# Patient Record
Sex: FEMALE | Race: WHITE | NOT HISPANIC OR LATINO | Employment: STUDENT | URBAN - METROPOLITAN AREA
[De-identification: names, ages, dates, MRNs, and addresses within clinical notes are randomized per-mention and may not be internally consistent; named-entity substitution may affect disease eponyms.]

---
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
## 2024-10-31 ENCOUNTER — TELEPHONE (OUTPATIENT)
Dept: PEDIATRICS | Facility: CLINIC | Age: 19
End: 2024-10-31
Payer: COMMERCIAL

## 2024-11-19 ENCOUNTER — TELEPHONE (OUTPATIENT)
Dept: PEDIATRICS | Facility: CLINIC | Age: 19
End: 2024-11-19
Payer: COMMERCIAL

## 2024-11-19 DIAGNOSIS — J45.20 MILD INTERMITTENT ASTHMA WITHOUT COMPLICATION (HHS-HCC): Primary | ICD-10-CM

## 2024-11-19 NOTE — TELEPHONE ENCOUNTER
Rx Refill Request Telephone Encounter    Name:  Halina Jimenez  :  [id number]  Medication Name:  albuterol 90 mcg/actuation inhaler   Dose :    Route :    Frequency :    Quantity :    Directions :    Specific Pharmacy location:  [facility]/pharmacy #[id number]  [place], [place] - [address] AT [facility] [address]   Date of last appointment:  23  Date of next appointment:  24  Best number to reach patient:  [phone]     Pt is recovering from a cold which has caused a flare up requiring her inhaler.

## 2024-11-20 RX ORDER — ALBUTEROL SULFATE 90 UG/1
2 INHALANT RESPIRATORY (INHALATION) EVERY 4 HOURS PRN
Qty: 18 G | Refills: 0 | Status: SHIPPED | OUTPATIENT
Start: 2024-11-20

## 2024-11-22 ENCOUNTER — TELEPHONE (OUTPATIENT)
Dept: PEDIATRICS | Facility: CLINIC | Age: 19
End: 2024-11-22
Payer: COMMERCIAL

## 2024-11-22 DIAGNOSIS — J30.1 SEASONAL ALLERGIC RHINITIS DUE TO POLLEN: ICD-10-CM

## 2024-11-22 DIAGNOSIS — N92.0 MENORRHAGIA WITH REGULAR CYCLE: ICD-10-CM

## 2024-11-22 RX ORDER — MONTELUKAST SODIUM 10 MG/1
10 TABLET ORAL NIGHTLY
Qty: 90 TABLET | Refills: 0 | Status: SHIPPED | OUTPATIENT
Start: 2024-11-22 | End: 2025-02-20

## 2024-11-22 RX ORDER — NORETHINDRONE AND ETHINYL ESTRADIOL 0.5-0.035
1 KIT ORAL DAILY
Qty: 84 TABLET | Refills: 0 | Status: SHIPPED | OUTPATIENT
Start: 2024-11-22 | End: 2025-02-14

## 2024-11-22 NOTE — TELEPHONE ENCOUNTER
Rx Refill Request Telephone Encounter    Name:  Halina Jimenez  :  [id number]  Medication Name:  montelukast (Singulair) 10 mg tablet ,Nortrel 0.5/35, 28, 0.5-35 mg-mcg tablet     Specific Pharmacy location:  [facility]/pharmacy #[id number] [facility] [address] AT [address] [facility] [address]   Date of last appointment:  23  Date of next appointment:24  Best number to reach patient:  [phone]

## 2024-12-20 ENCOUNTER — APPOINTMENT (OUTPATIENT)
Dept: PEDIATRICS | Facility: CLINIC | Age: 19
End: 2024-12-20
Payer: COMMERCIAL

## 2024-12-20 VITALS
HEART RATE: 82 BPM | OXYGEN SATURATION: 96 % | DIASTOLIC BLOOD PRESSURE: 70 MMHG | BODY MASS INDEX: 29.77 KG/M2 | WEIGHT: 168 LBS | HEIGHT: 63 IN | SYSTOLIC BLOOD PRESSURE: 132 MMHG

## 2024-12-20 DIAGNOSIS — Z23 NEED FOR VACCINATION: ICD-10-CM

## 2024-12-20 DIAGNOSIS — L98.8 PILONIDAL DISEASE: ICD-10-CM

## 2024-12-20 DIAGNOSIS — R41.840 INATTENTION: ICD-10-CM

## 2024-12-20 DIAGNOSIS — J45.20 MILD INTERMITTENT ASTHMA WITHOUT COMPLICATION (HHS-HCC): ICD-10-CM

## 2024-12-20 DIAGNOSIS — J30.1 SEASONAL ALLERGIC RHINITIS DUE TO POLLEN: ICD-10-CM

## 2024-12-20 DIAGNOSIS — Z23 NEED FOR INFLUENZA VACCINATION: ICD-10-CM

## 2024-12-20 DIAGNOSIS — N92.0 MENORRHAGIA WITH REGULAR CYCLE: ICD-10-CM

## 2024-12-20 DIAGNOSIS — Z00.00 WELLNESS EXAMINATION: Primary | ICD-10-CM

## 2024-12-20 PROBLEM — R06.00 DYSPNEA: Status: RESOLVED | Noted: 2023-11-07 | Resolved: 2024-12-20

## 2024-12-20 PROBLEM — J38.3 VOCAL CORD DYSFUNCTION: Status: RESOLVED | Noted: 2022-03-23 | Resolved: 2024-12-20

## 2024-12-20 PROBLEM — R42 DIZZINESS: Status: RESOLVED | Noted: 2023-11-07 | Resolved: 2024-12-20

## 2024-12-20 PROBLEM — K59.00 CONSTIPATION: Status: RESOLVED | Noted: 2022-07-15 | Resolved: 2024-12-20

## 2024-12-20 PROBLEM — L05.01 PILONIDAL CYST WITH ABSCESS: Status: RESOLVED | Noted: 2020-02-10 | Resolved: 2024-12-20

## 2024-12-20 PROCEDURE — 90472 IMMUNIZATION ADMIN EACH ADD: CPT | Performed by: PEDIATRICS

## 2024-12-20 PROCEDURE — 99213 OFFICE O/P EST LOW 20 MIN: CPT | Performed by: PEDIATRICS

## 2024-12-20 PROCEDURE — 90715 TDAP VACCINE 7 YRS/> IM: CPT | Performed by: PEDIATRICS

## 2024-12-20 PROCEDURE — 1036F TOBACCO NON-USER: CPT | Performed by: PEDIATRICS

## 2024-12-20 PROCEDURE — 96127 BRIEF EMOTIONAL/BEHAV ASSMT: CPT | Performed by: PEDIATRICS

## 2024-12-20 PROCEDURE — 99395 PREV VISIT EST AGE 18-39: CPT | Performed by: PEDIATRICS

## 2024-12-20 PROCEDURE — 90656 IIV3 VACC NO PRSV 0.5 ML IM: CPT | Performed by: PEDIATRICS

## 2024-12-20 PROCEDURE — 3008F BODY MASS INDEX DOCD: CPT | Performed by: PEDIATRICS

## 2024-12-20 PROCEDURE — 90471 IMMUNIZATION ADMIN: CPT | Performed by: PEDIATRICS

## 2024-12-20 RX ORDER — BUDESONIDE AND FORMOTEROL FUMARATE DIHYDRATE 160; 4.5 UG/1; UG/1
2 AEROSOL RESPIRATORY (INHALATION)
Qty: 10.2 G | Refills: 2 | Status: SHIPPED | OUTPATIENT
Start: 2024-12-20 | End: 2024-12-21

## 2024-12-20 RX ORDER — MONTELUKAST SODIUM 10 MG/1
10 TABLET ORAL NIGHTLY
Qty: 90 TABLET | Refills: 3 | Status: SHIPPED | OUTPATIENT
Start: 2024-12-20 | End: 2025-12-15

## 2024-12-20 RX ORDER — ALBUTEROL SULFATE 90 UG/1
2 INHALANT RESPIRATORY (INHALATION) EVERY 4 HOURS PRN
Qty: 18 G | Refills: 4 | Status: SHIPPED | OUTPATIENT
Start: 2024-12-20

## 2024-12-20 RX ORDER — NORETHINDRONE AND ETHINYL ESTRADIOL 0.5-0.035
1 KIT ORAL DAILY
Qty: 84 TABLET | Refills: 4 | Status: SHIPPED | OUTPATIENT
Start: 2024-12-20 | End: 2026-02-13

## 2024-12-20 ASSESSMENT — PATIENT HEALTH QUESTIONNAIRE - PHQ9
3. TROUBLE FALLING OR STAYING ASLEEP OR SLEEPING TOO MUCH: SEVERAL DAYS
8. MOVING OR SPEAKING SO SLOWLY THAT OTHER PEOPLE COULD HAVE NOTICED. OR THE OPPOSITE, BEING SO FIGETY OR RESTLESS THAT YOU HAVE BEEN MOVING AROUND A LOT MORE THAN USUAL: NOT AT ALL
SUM OF ALL RESPONSES TO PHQ QUESTIONS 1-9: 8
4. FEELING TIRED OR HAVING LITTLE ENERGY: SEVERAL DAYS
6. FEELING BAD ABOUT YOURSELF - OR THAT YOU ARE A FAILURE OR HAVE LET YOURSELF OR YOUR FAMILY DOWN: SEVERAL DAYS
1. LITTLE INTEREST OR PLEASURE IN DOING THINGS: NOT AT ALL
2. FEELING DOWN, DEPRESSED OR HOPELESS: NOT AT ALL
SUM OF ALL RESPONSES TO PHQ9 QUESTIONS 1 AND 2: 0
9. THOUGHTS THAT YOU WOULD BE BETTER OFF DEAD, OR OF HURTING YOURSELF: NOT AT ALL
5. POOR APPETITE OR OVEREATING: MORE THAN HALF THE DAYS
7. TROUBLE CONCENTRATING ON THINGS, SUCH AS READING THE NEWSPAPER OR WATCHING TELEVISION: NEARLY EVERY DAY

## 2024-12-20 NOTE — PROGRESS NOTES
3 concerns in addition to well issues:  1) was sick with a resp infection over Thanksgiving then got better.  Got sick again before finals started and is still coughing.  Night sweats at the beginning which have resolved.  Was using albuterol inhaler for illness and exercise.  Really used it just once this fall.  Then has been using it regularly since Thanksgiving.  Helps when it's on board.  Uses singulair daily.   2) Anxiety - really affecting her ability to function. Her weight affects her mood.  She is very insecure about how others feel about her.  Freshman year was hard and then last year was ok but this fall was bad again.    3) Wonders if she should get treated for add?  Would this help? Was evaluated at some point but has never been on meds.  Will need to track down the Nikolai eval    1/2/2024 - I called mom and asked her to track down the Nikolai report and get it to me.

## 2024-12-20 NOTE — ASSESSMENT & PLAN NOTE
Rec counseling but she will be abroad for the semester - she thinks she will be fine.  Follow up with me when she returns.  I need to contact mom in regards to the Heydi evaluation.     Books for Anxiety:  Freeing Yourself From Anxiety by Kendra Donovan  The Worry Cure by Mike Templeton    Apps:   Calm Judy; Mood [place]; Insight Timer; Happy Not Perfect

## 2024-12-20 NOTE — PROGRESS NOTES
"Subjective   History was provided by Halina.  Halina Jimenez is a 19 y.o. female who is here for this well visit.      Current Issues:  Current concerns: ADHD, anxiety, asthma/cough - see additional note.    Vision or hearing concerns? no  Dental care up to date? Yes- brushes teeth 2 times/day, regular dental visits, does floss teeth   No significant recent health issues - was actually healthy until Thanksgiving.   Specialist visits - none    Review of Nutrition, Elimination, and Sleep:  Current diet:  3 meals/day, diet well balanced, normal portions, <8oz. sugar containing beverages daily, adequate dairy intake, diet includes fruits and vegetables and protein.  Elimination: normal bowel movement frequency, normal consistency   Sleep: has structured bedtime routine, sleeps through the night, no trouble getting up    School and Social Screening:  School: [employer].  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   Supportive social network. Current relationship - none.     Sports Participation Screening:  Gets regular exercise.    Screening Questions:  Other: normal mood, satisfied with body weight  Risk factors for dyslipidemia: no  Risk factors for sexually-transmitted infections:   Sexually active: no   Using condoms: N/A  Substance use:  Smoking - No  Vaping - No  Drinking - Yes  Drugs - No  Genitourinary:  normal menses - no issues    Objective   /70 (BP Location: Left arm, Patient Position: Sitting)   Pulse 82   Ht 1.607 m (5' 3.25\")   Wt 76.2 kg (168 lb)   SpO2 96%   BMI 29.53 kg/m²   Growth parameters are noted and are appropriate for age.    Physical Exam  Constitutional:       Appearance: Normal appearance.   HENT:      Right Ear: Tympanic membrane normal.      Left Ear: Tympanic membrane normal.      Nose: Nose normal.      Mouth/Throat:      Mouth: Mucous membranes are moist.      Pharynx: Oropharynx is clear.   Eyes:      Extraocular Movements: Extraocular " movements intact.   Cardiovascular:      Rate and Rhythm: Normal rate and regular rhythm.      Pulses:           Femoral pulses are 2+ on the right side and 2+ on the left side.     Heart sounds: No murmur heard.  Pulmonary:      Effort: Pulmonary effort is normal.      Breath sounds: Normal breath sounds.   Chest:   Breasts:     Bc Score is 5.      Breasts are symmetrical.   Abdominal:      General: Abdomen is flat.      Palpations: Abdomen is soft. There is no hepatomegaly, splenomegaly or mass.   Genitourinary:     Comments: Pt declines exam  Musculoskeletal:         General: Normal range of motion.      Cervical back: Normal range of motion and neck supple.      Thoracic back: No scoliosis.      Lumbar back: No scoliosis.   Lymphadenopathy:      Cervical: No cervical adenopathy.   Skin:     General: Skin is warm.      Findings: No acne.   Neurological:      General: No focal deficit present.      Mental Status: She is alert.      Deep Tendon Reflexes:      Reflex Scores:       Patellar reflexes are 2+ on the right side and 2+ on the left side.  Psychiatric:         Mood and Affect: Mood normal.         Behavior: Behavior normal.         Assessment/Plan   Halina was seen today for well child.  Diagnoses and all orders for this visit:  Wellness examination (Primary)  Need for vaccination  -     Tdap vaccine, age 7 years and older  Need for influenza vaccination  -     Flu vaccine, trivalent, preservative free, age 6 months and greater (Fluraix/Fluzone/Flulaval)  Mild intermittent asthma without complication ([facility])  -     albuterol 90 mcg/actuation inhaler; Inhale 2 puffs every 4 hours if needed for wheezing.  -     budesonide-formoteroL (Symbicort) 160-4.5 mcg/actuation inhaler; Inhale 2 puffs 2 times a day. Rinse mouth with water after use to reduce aftertaste and incidence of candidiasis. Do not swallow.  Inattention  Menorrhagia with regular cycle  -     Nortrel 0.5/35, 28, 0.5-35 mg-mcg tablet; Take 1  tablet by mouth once daily.  Seasonal allergic rhinitis due to pollen  -     montelukast (Singulair) 10 mg tablet; Take 1 tablet (10 mg) by mouth once daily at bedtime.  Pilonidal disease  Other orders  -     Follow Up In Pediatrics - Health Maintenance; Future  -     Follow Up In Pediatrics - Established Behavioral; Future    Well young adult.  - Anticipatory guidance discussed.   - Injury prevention: wearing seatbelt, understanding sun protection, understanding conflict resolution/violence prevention,  reviewed driving safety    -Risk Taking: cardiac risk factors reviewed, alcohol, drug and tobacco use reviewed, reviewed internet safety      - Growth and weight gain appropriate. The patient was counseled regarding nutrition and physical activity.  - Development: appropriate for age  - Get in the habit of monthly breast exams.    - Immunizations as ordered.  All vaccines given at today’s visit were reviewed with the family. Risks/benefits/side effects discussed and VIS sheet provided. All questions answered. Given with consent   - Follow up in 1 year for next well child exam or sooner with concerns.    Problem List Items Addressed This Visit       Menorrhagia    Relevant Medications    Nortrel 0.5/35, 28, 0.5-35 mg-mcg tablet    Mild intermittent asthma without complication ([facility])     Due to increased symptoms will try steroid/long acting medication.           Relevant Medications    albuterol 90 mcg/actuation inhaler    budesonide-formoteroL (Symbicort) 160-4.5 mcg/actuation inhaler    Pilonidal disease     No issues.          Inattention     Other Visit Diagnoses       Wellness examination    -  Primary    Need for vaccination        Relevant Orders    Tdap vaccine, age 7 years and older (Completed)    Need for influenza vaccination        Relevant Orders    Flu vaccine, trivalent, preservative free, age 6 months and greater (Fluraix/Fluzone/Flulaval) (Completed)    Seasonal allergic rhinitis due to pollen         Relevant Medications    montelukast (Singulair) 10 mg tablet

## 2024-12-21 RX ORDER — FLUTICASONE PROPIONATE AND SALMETEROL 250; 50 UG/1; UG/1
1 POWDER RESPIRATORY (INHALATION)
Qty: 60 EACH | Refills: 2 | Status: SHIPPED | OUTPATIENT
Start: 2024-12-21 | End: 2025-03-21

## 2024-12-27 NOTE — TELEPHONE ENCOUNTER
Pt called is away at college,  has taken antibiotics frequently in the past year for various reasons. Pt has a cut on thumb that [place]not a ligia ladder). Went to an urgent care, and was prescribed cephalexin. Pt wants to know if it is worth taking the antibiotic because she has been on antibiotics so much in the past year. She is also concerned if it is the right thing to take for the cut.   Pt scheduled.

## 2025-02-26 ENCOUNTER — TELEPHONE (OUTPATIENT)
Dept: PEDIATRICS | Facility: CLINIC | Age: 20
End: 2025-02-26

## 2025-02-26 NOTE — TELEPHONE ENCOUNTER
Phone call from patient, patient is currently in [facility]. Pt was checked for lice and they found nits at a lice clinic. They did the lice comb. Bought the lice shampoo and spray, will go back next week at get re-checked. Clinic advised putting brushes, hair ties in freezer for few days. Also washed all fabric in the the house. Patient is [place] country, wanted to check if you would agree with the advice. Did not offer appointment since she is out of country.  Patient was informed a return call can take up to 24-48 hours, ok with waiting.

## 2025-02-28 NOTE — TELEPHONE ENCOUNTER
I discussed with Halina.  The directions are fine.  All roommates are away this weekend so the apartment should be lice free when they get back.

## 2025-05-12 ENCOUNTER — APPOINTMENT (OUTPATIENT)
Dept: PEDIATRICS | Facility: CLINIC | Age: 20
End: 2025-05-12
Payer: COMMERCIAL

## 2025-05-12 VITALS
HEART RATE: 81 BPM | DIASTOLIC BLOOD PRESSURE: 82 MMHG | SYSTOLIC BLOOD PRESSURE: 119 MMHG | WEIGHT: 175 LBS | BODY MASS INDEX: 29.88 KG/M2 | HEIGHT: 64 IN

## 2025-05-12 DIAGNOSIS — F41.9 ANXIETY: ICD-10-CM

## 2025-05-12 DIAGNOSIS — F32.9 REACTIVE DEPRESSION: ICD-10-CM

## 2025-05-12 DIAGNOSIS — R63.5 WEIGHT GAIN: Primary | ICD-10-CM

## 2025-05-12 PROBLEM — S39.92XA INJURY OF COCCYX: Status: ACTIVE | Noted: 2025-05-12

## 2025-05-12 PROBLEM — H69.90 DYSFUNCTION OF EUSTACHIAN TUBE: Status: ACTIVE | Noted: 2025-05-12

## 2025-05-12 PROBLEM — J30.9 ALLERGIC RHINITIS: Status: ACTIVE | Noted: 2025-05-12

## 2025-05-12 PROBLEM — J38.7 DISEASE OF LARYNX: Status: ACTIVE | Noted: 2025-05-12

## 2025-05-12 PROCEDURE — 3008F BODY MASS INDEX DOCD: CPT | Performed by: PEDIATRICS

## 2025-05-12 PROCEDURE — 1036F TOBACCO NON-USER: CPT | Performed by: PEDIATRICS

## 2025-05-12 PROCEDURE — 96127 BRIEF EMOTIONAL/BEHAV ASSMT: CPT | Performed by: PEDIATRICS

## 2025-05-12 PROCEDURE — 99215 OFFICE O/P EST HI 40 MIN: CPT | Performed by: PEDIATRICS

## 2025-05-12 NOTE — PROGRESS NOTES
Subjective   Halina Jimenez is a 20 y.o. female who presents for addressing anxiety disorder and mood concerns, and ongoing weight concerns which adversely affects both mood and anxiety. She has the following anxiety symptoms: insomnia, irritable, palpitations, psychomotor agitation, racing thoughts, shortness of breath, and nausea. Onset of symptoms was years ago although it has increased over the last 2 years. Symptoms have been gradually worsening since that time. She denies current suicidal and homicidal ideation. Family history significant for anxiety. Risk factors: several different issues all affecting each other. Previous treatment includes counseling but it has been years.     Worse - thoughts about weight, new situations, social interaction  Better - talk to mom, write about it    Eating - overall good - bit stress eats  Sleep - hasn't been sleeping through the night    Mood - sometimes defeated, angry  Likes to be alone in her room    Some alcohol - no issues  No weed.     Currently with skin issues because she's off allegra for allergy testing  Upper lip feels like a chemical burn for a month with mild swelling - no change in products  A few episodes of chills recently  Hair fine  Has always had an issue with pooping although better since being back from [facility]  Speech is bad when she has anxiety - can't get words out  Energy level is pretty good    Objective   Physical Exam    Assessment/Plan   Halina was seen today for anxiety.  Diagnoses and all orders for this visit:  Weight gain (Primary)  -     TSH with reflex to Free T4 if abnormal; Future  -     CBC and Auto Differential; Future  -     Ferritin; Future  -     Iron and TIBC; Future  -     Comprehensive Metabolic Panel; Future  -     Lipid Panel; Future  -     Hemoglobin A1C; Future  -     Testosterone,Free and Total; Future  -     TSH with reflex to Free T4 if abnormal  -     CBC and Auto Differential  -     Ferritin  -     Iron and TIBC  -      Comprehensive Metabolic Panel  -     Lipid Panel  -     Hemoglobin A1C  -     Testosterone,Free and Total  Anxiety  Reactive depression    Halina as an anxiety disorder which is worsening, affecting mood, and adversely influenced by ongoing weight concerns.  It is reasonable to rule out possible organic contributing causes.  Medications: Zoloft. Start at 25 and increase to 50 at 2 weeks if there are no side effects. Follow up at 2-3 weeks.  Discussed r/B.  Refer to counseling:  The following is an ever changing list of both groups and individuals who offer care for a variety of mental health issues. Please look them up on line to confirm that they offer services in line with what is needed.    Groups:  [facility]  [facility]    Individuals:  Psychologists:  Jamey Reynoso Aarti    Social Work:  Regina Queen Becca    Refer to Endocrine for discussion re semaglutide.   [facility] - weight loss including semaglutides    I spent a total of 50 minutes on the date of service which included preparing to see the patient, face to face patient care, completing clinical documentation, obtaining and/or reviewing separately reviewed history, with more than half the time spent performing a medically appropriate examination, counseling and educating the patient/family/caregiver and ordering medications, tests, or procedures.

## 2025-05-14 ENCOUNTER — PATIENT MESSAGE (OUTPATIENT)
Dept: PEDIATRICS | Facility: CLINIC | Age: 20
End: 2025-05-14
Payer: COMMERCIAL

## 2025-05-14 ENCOUNTER — TELEPHONE (OUTPATIENT)
Dept: PEDIATRICS | Facility: CLINIC | Age: 20
End: 2025-05-14
Payer: COMMERCIAL

## 2025-05-14 DIAGNOSIS — F41.9 ANXIETY: Primary | ICD-10-CM

## 2025-05-14 RX ORDER — SERTRALINE HYDROCHLORIDE 25 MG/1
TABLET, FILM COATED ORAL
Qty: 42 TABLET | Refills: 0 | Status: SHIPPED | OUTPATIENT
Start: 2025-05-14 | End: 2025-06-11

## 2025-05-14 NOTE — TELEPHONE ENCOUNTER
Halina called to check on status of prescription from 5/12/25. Could you send the script in for the Zoloft?

## 2025-05-16 ENCOUNTER — OFFICE VISIT (OUTPATIENT)
Dept: PEDIATRICS | Facility: CLINIC | Age: 20
End: 2025-05-16
Payer: COMMERCIAL

## 2025-05-16 VITALS
DIASTOLIC BLOOD PRESSURE: 83 MMHG | HEART RATE: 86 BPM | BODY MASS INDEX: 30.05 KG/M2 | WEIGHT: 176 LBS | SYSTOLIC BLOOD PRESSURE: 117 MMHG | TEMPERATURE: 99.3 F | HEIGHT: 64 IN

## 2025-05-16 DIAGNOSIS — J06.9 VIRAL UPPER RESPIRATORY TRACT INFECTION: Primary | ICD-10-CM

## 2025-05-16 DIAGNOSIS — J30.9 ALLERGIC RHINITIS, UNSPECIFIED SEASONALITY, UNSPECIFIED TRIGGER: ICD-10-CM

## 2025-05-16 LAB
ALBUMIN SERPL-MCNC: 4.7 G/DL (ref 3.6–5.1)
ALP SERPL-CCNC: 56 U/L (ref 31–125)
ALT SERPL-CCNC: 16 U/L (ref 6–29)
ANION GAP SERPL CALCULATED.4IONS-SCNC: 10 MMOL/L (CALC) (ref 7–17)
AST SERPL-CCNC: 21 U/L (ref 10–30)
BASOPHILS # BLD AUTO: 32 CELLS/UL (ref 0–200)
BASOPHILS NFR BLD AUTO: 0.5 %
BILIRUB SERPL-MCNC: 0.5 MG/DL (ref 0.2–1.2)
BUN SERPL-MCNC: 14 MG/DL (ref 7–25)
CALCIUM SERPL-MCNC: 9.8 MG/DL (ref 8.6–10.2)
CHLORIDE SERPL-SCNC: 102 MMOL/L (ref 98–110)
CHOLEST SERPL-MCNC: 187 MG/DL
CHOLEST/HDLC SERPL: 2.9 (CALC)
CO2 SERPL-SCNC: 26 MMOL/L (ref 20–32)
CREAT SERPL-MCNC: 0.63 MG/DL (ref 0.5–0.96)
EGFRCR SERPLBLD CKD-EPI 2021: 129 ML/MIN/1.73M2
EOSINOPHIL # BLD AUTO: 90 CELLS/UL (ref 15–500)
EOSINOPHIL NFR BLD AUTO: 1.4 %
ERYTHROCYTE [DISTWIDTH] IN BLOOD BY AUTOMATED COUNT: 13.1 % (ref 11–15)
EST. AVERAGE GLUCOSE BLD GHB EST-MCNC: 111 MG/DL
EST. AVERAGE GLUCOSE BLD GHB EST-SCNC: 6.2 MMOL/L
FERRITIN SERPL-MCNC: 108 NG/ML (ref 16–154)
GLUCOSE SERPL-MCNC: 88 MG/DL (ref 65–99)
HBA1C MFR BLD: 5.5 %
HCT VFR BLD AUTO: 41.2 % (ref 35–45)
HDLC SERPL-MCNC: 65 MG/DL
HGB BLD-MCNC: 13.5 G/DL (ref 11.7–15.5)
IRON SATN MFR SERPL: 18 % (CALC) (ref 16–45)
IRON SERPL-MCNC: 77 MCG/DL (ref 40–190)
LDLC SERPL CALC-MCNC: 99 MG/DL (CALC)
LYMPHOCYTES # BLD AUTO: 1914 CELLS/UL (ref 850–3900)
LYMPHOCYTES NFR BLD AUTO: 29.9 %
MCH RBC QN AUTO: 29.3 PG (ref 27–33)
MCHC RBC AUTO-ENTMCNC: 32.8 G/DL (ref 32–36)
MCV RBC AUTO: 89.4 FL (ref 80–100)
MONOCYTES # BLD AUTO: 333 CELLS/UL (ref 200–950)
MONOCYTES NFR BLD AUTO: 5.2 %
NEUTROPHILS # BLD AUTO: 4032 CELLS/UL (ref 1500–7800)
NEUTROPHILS NFR BLD AUTO: 63 %
NONHDLC SERPL-MCNC: 122 MG/DL (CALC)
PLATELET # BLD AUTO: 244 THOUSAND/UL (ref 140–400)
PMV BLD REES-ECKER: 10.8 FL (ref 7.5–12.5)
POTASSIUM SERPL-SCNC: 4.6 MMOL/L (ref 3.5–5.3)
PROT SERPL-MCNC: 7.8 G/DL (ref 6.1–8.1)
RBC # BLD AUTO: 4.61 MILLION/UL (ref 3.8–5.1)
SODIUM SERPL-SCNC: 138 MMOL/L (ref 135–146)
TESTOST FREE SERPL-MCNC: 1.2 PG/ML (ref 0.1–6.4)
TESTOST SERPL-MCNC: 24 NG/DL (ref 2–45)
TIBC SERPL-MCNC: 417 MCG/DL (CALC) (ref 250–450)
TRIGL SERPL-MCNC: 134 MG/DL
TSH SERPL-ACNC: 3.08 MIU/L
WBC # BLD AUTO: 6.4 THOUSAND/UL (ref 3.8–10.8)

## 2025-05-16 PROCEDURE — 99213 OFFICE O/P EST LOW 20 MIN: CPT | Performed by: PEDIATRICS

## 2025-05-16 PROCEDURE — 3008F BODY MASS INDEX DOCD: CPT | Performed by: PEDIATRICS

## 2025-05-16 NOTE — PROGRESS NOTES
"Nilesh Jimenez is a 20 y.o. female who presents for Nasal Congestion (Here by herself for congestion/ headache).    HPI:    Recently came off allergy meds for a week for testing.  Developed sx at that time.  Back on the meds: antihistamine and singulair and azalastine.  Sx not resolved.  Sx have been about 6 days.    Is getting shaking chills, hot flashes.  Started first dose of zoloft today.  No known sick contacts.    Asthma not bad, but did take inhaler this morning for her cough.       Objective   /83   Pulse 86   Temp 37.4 °C (99.3 °F)   Ht 1.613 m (5' 3.5\")   Wt 79.8 kg (176 lb)   BMI 30.69 kg/m²   Physical Exam  Constitutional:       Appearance: Normal appearance.   HENT:      Right Ear: Tympanic membrane and external ear normal.      Left Ear: Tympanic membrane and external ear normal.      Nose: Congestion and rhinorrhea present.      Mouth/Throat:      Mouth: Mucous membranes are moist.   Eyes:      General:         Right eye: No discharge.         Left eye: No discharge.      Extraocular Movements: Extraocular movements intact.      Conjunctiva/sclera: Conjunctivae normal.      Pupils: Pupils are equal, round, and reactive to light.   Cardiovascular:      Rate and Rhythm: Normal rate and regular rhythm.      Heart sounds: Normal heart sounds.   Pulmonary:      Effort: Pulmonary effort is normal.      Breath sounds: Normal breath sounds.   Abdominal:      General: Bowel sounds are normal.      Palpations: Abdomen is soft.   Musculoskeletal:      Cervical back: Neck supple.   Lymphadenopathy:      Cervical: No cervical adenopathy.   Skin:     General: Skin is warm.   Neurological:      General: No focal deficit present.      Mental Status: She is alert.       Assessment/Plan   Problem List Items Addressed This Visit          Medium    Allergic rhinitis    Overview   Comment on above: Added by Problem List Migration; 2013-12-5;          Other Visit Diagnoses         Viral upper " respiratory tract infection    -  Primary        Exam looks more like allergic symptoms, but it does sound like she may have had some fevers.    Maximize allergy treatments:  antihistamine, singulair, azalastine, and sensimyst.  If this is a viral URI, will need to run its course:  sleep, hydration, and time.

## 2025-05-28 RX ORDER — FLUTICASONE FUROATE 27.5 UG/1
2 SPRAY, METERED NASAL
COMMUNITY
Start: 2025-05-14

## 2025-05-28 RX ORDER — AZELASTINE 1 MG/ML
2 SPRAY, METERED NASAL 2 TIMES DAILY
COMMUNITY
Start: 2023-01-09

## 2025-05-28 RX ORDER — DESONIDE 0.5 MG/G
OINTMENT TOPICAL
COMMUNITY
Start: 2025-05-14

## 2025-05-29 ENCOUNTER — APPOINTMENT (OUTPATIENT)
Dept: PEDIATRICS | Facility: CLINIC | Age: 20
End: 2025-05-29
Payer: COMMERCIAL

## 2025-05-29 VITALS
BODY MASS INDEX: 29.88 KG/M2 | WEIGHT: 175 LBS | SYSTOLIC BLOOD PRESSURE: 105 MMHG | HEART RATE: 82 BPM | HEIGHT: 64 IN | DIASTOLIC BLOOD PRESSURE: 72 MMHG

## 2025-05-29 DIAGNOSIS — F32.9 REACTIVE DEPRESSION: ICD-10-CM

## 2025-05-29 DIAGNOSIS — R61 NIGHT SWEATS: ICD-10-CM

## 2025-05-29 DIAGNOSIS — F41.9 ANXIETY: Primary | ICD-10-CM

## 2025-05-29 PROCEDURE — 99214 OFFICE O/P EST MOD 30 MIN: CPT | Performed by: PEDIATRICS

## 2025-05-29 PROCEDURE — 3008F BODY MASS INDEX DOCD: CPT | Performed by: PEDIATRICS

## 2025-05-29 RX ORDER — SERTRALINE HYDROCHLORIDE 50 MG/1
50 TABLET, FILM COATED ORAL DAILY
Qty: 30 TABLET | Refills: 0 | Status: SHIPPED | OUTPATIENT
Start: 2025-05-29 | End: 2025-05-29 | Stop reason: SINTOL

## 2025-05-29 RX ORDER — FLUOXETINE 10 MG/1
TABLET ORAL
Qty: 63 TABLET | Refills: 0 | Status: SHIPPED | OUTPATIENT
Start: 2025-05-29 | End: 2025-07-03

## 2025-05-29 NOTE — PROGRESS NOTES
"Mental Health Follow-up  Halina Jimenez is a 20 y.o. female  following up today for mental health status.  No benefits to meds yet.  Counseling: not yet  Current medication: zoloft 25mg  Patient reports that she is having night sweats throughout the night that wake her.  Patient reports symptoms have remained unchanged since last visit.  Has always been hot at night but has never had drenching sweats.  Needs to change where she is in bed.  Wakes her several times a night.  No recent travel to malaria areas. Normal lab work in May.  Is fatigued but otherwise no physical symptoms.     Depression Symptoms: is doing ok    Anxiety Symptoms: same - social    Self Harm Symptoms:   Self Mutilation: denies  Suicidal Ideation: denies    Alcohol and drug use discussed and denied.     Review of Systems: Negative except those noted in current and interim history    PHYSICAL EXAM  /72 (BP Location: Right arm, Patient Position: Sitting, BP Cuff Size: Large adult)   Pulse 82   Ht 1.626 m (5' 4\")   Wt 79.4 kg (175 lb)   BMI 30.04 kg/m²     General:  alert and oriented, in no acute distress   HEENT:  throat normal without erythema or exudate   Neck: no adenopathy and thyroid not enlarged, symmetric, no tenderness/mass/nodules.   Lungs: clear to auscultation bilaterally   Heart: regular rate and rhythm, S1, S2 normal, no murmur, click, rub or gallop   Skin:  warm and dry, no hyperpigmentation, vitiligo, or suspicious lesions      Extremities:  extremities normal, warm and well-perfused; no cyanosis, clubbing, or edema      Neurological: alert, oriented x 3, no deficits noted in general exam.     ASSESSMENT AND PLAN  Diagnoses and all orders for this visit:  Anxiety  -     FLUoxetine (PROzac) 10 mg tablet; Take 1 tablet (10 mg) by mouth once daily for 7 days, THEN 2 tablets (20 mg) once daily for 28 days.  Night sweats  Reactive depression      Halina Jimenez does meet criteria for Adjustment Disorder with Mixed Anxiety and " Depressed Mood and weight concerns.    The plan is to increase dose of zoloft* to at 50 mg/day.    Recommendations were discussed and patient and/or parent agree(s) to the above plan. Patient and/or parent demonstrate understanding and acceptance of risks and benefits and plan.  Contracted verbally for safety.  Patient instructed to call if concerns and to follow up in clinic in 1 month. May return to clinic or call sooner if significant side effects or concerns.  I discussed nightsweats with psychiatry and 2 reported that they had seen it as a side effect that didn't get better.  We had chosen zoloft due to mom being on it.  Will switch to prozac.  I spent 20 minutes of a total visit time of 30 minutes (>50%) counseling, coordinating services and care plan.  Reviewed allergy notes.

## 2025-07-01 ENCOUNTER — APPOINTMENT (OUTPATIENT)
Dept: ENDOCRINOLOGY | Facility: CLINIC | Age: 20
End: 2025-07-01
Payer: COMMERCIAL

## 2025-07-01 VITALS — HEIGHT: 64 IN | BODY MASS INDEX: 29.88 KG/M2 | WEIGHT: 175 LBS

## 2025-07-01 DIAGNOSIS — Z00.00 HEALTH MAINTENANCE EXAMINATION: ICD-10-CM

## 2025-07-01 DIAGNOSIS — E66.811 OBESITY, CLASS I, BMI 30-34.9: Primary | ICD-10-CM

## 2025-07-01 DIAGNOSIS — F41.9 ANXIETY: ICD-10-CM

## 2025-07-01 PROCEDURE — 1036F TOBACCO NON-USER: CPT | Performed by: NURSE PRACTITIONER

## 2025-07-01 PROCEDURE — 99204 OFFICE O/P NEW MOD 45 MIN: CPT | Performed by: NURSE PRACTITIONER

## 2025-07-01 PROCEDURE — 3008F BODY MASS INDEX DOCD: CPT | Performed by: NURSE PRACTITIONER

## 2025-07-01 RX ORDER — NALTREXONE HYDROCHLORIDE AND BUPROPION HYDROCHLORIDE 8; 90 MG/1; MG/1
TABLET, EXTENDED RELEASE ORAL
Qty: 360 TABLET | Refills: 0 | Status: SHIPPED | OUTPATIENT
Start: 2025-07-01

## 2025-07-01 ASSESSMENT — ENCOUNTER SYMPTOMS
POLYDIPSIA: 0
LOSS OF SENSATION IN FEET: 0
OCCASIONAL FEELINGS OF UNSTEADINESS: 0
POLYPHAGIA: 0
WHEEZING: 0
SHORTNESS OF BREATH: 0
DEPRESSION: 0
FREQUENCY: 0
NAUSEA: 0
NUMBNESS: 0
ARTHRALGIAS: 0
PALPITATIONS: 0
CONSTIPATION: 0
NERVOUS/ANXIOUS: 0
DYSPHORIC MOOD: 0
FATIGUE: 0

## 2025-07-01 ASSESSMENT — PATIENT HEALTH QUESTIONNAIRE - PHQ9
1. LITTLE INTEREST OR PLEASURE IN DOING THINGS: NOT AT ALL
SUM OF ALL RESPONSES TO PHQ9 QUESTIONS 1 AND 2: 1
2. FEELING DOWN, DEPRESSED OR HOPELESS: SEVERAL DAYS

## 2025-07-01 NOTE — PROGRESS NOTES
"This is a virtual visit where physical exam is limited and ROS and hx is obtained.    Halina Jimenez presents for weight loss management and obesity consult today.    Referred by her PCP for weight management consult.    She has had increased worry about her weight and had anxiety of her weight through the years and it has built up recently.She has episodes where she has lost weight and then regains it and finds her anxiety has contributed to this process.  She's working on initiating a relationship with a therapist to address the emotional components.    PMH:  Obesity, Anxiety     Obesity History:  Childhood or teen obesity history: yes  Age of Onset: adolescence  Lowest adult weight: 142  Highest adult weight: 178  Current weight: 171     Family history of obesity: no    Biggest challenge with weight management:  Recently the weight plateau and emotional component to eating    History of Weight Loss Efforts: no  Successful weight loss techniques attempted: gym weight loss program and loss 20 pounds, this was during a time of set schedule and she was playing soccer  Unsuccessful weight loss techniques attempted: nothing mentioned    Current typical daily diet:  Typical Breakfast: skips sometimes  Typical Lunch: left overs from dinner- \"healthy home cooked meals\" flank steak, salad, salmon. fruits  Typical Dinner: lean meat, vegetable, small starch (orzo, potatoes)  Soda/latte weekly intake: drinks Celcius  Take out/carry out/fast food weekly:  Portion sizes/seconds with meals: small to medium    Snacking  Daytime snacks: yes  Evening snacks: yes      Describe Appetite (always hungry/no hunger/average): hunger before meals and throughout the day  Are you full after a meal?  yes  Food Noise: Yes  Emotional eater? Yes   Boredom eater? Yes   Popcorn, protein bar, nuts    Current Exercise Habits \"not that good, at school I work out every day, but since I have work I have been tired and I   \"I was abroad last semester but " "I gained weight and I was drinking alcohol a lot and food wasn't great, I then lost weight since abroad and not stuck at this last weight for over a year\"    Sleep patterns (insomnia, waking in night) /hours of sleep per night: no DANIEL,n7-10 hours  H/O Sleep apnea: no  Well rested? Yes     Increased Stressors (describe daily stress): no      Any intake of an appetite suppressant or an anti-obesity medicine? No     H/O Pancreatitis: no  H/O Thyroid Medullary Cancer: no    Medical History[1]    Current Medications[2]        Review of Systems  Review of Systems   Constitutional:  Negative for fatigue.   Respiratory:  Negative for shortness of breath and wheezing.    Cardiovascular:  Negative for chest pain and palpitations.   Gastrointestinal:  Negative for constipation and nausea.   Endocrine: Negative for polydipsia, polyphagia and polyuria.   Genitourinary:  Negative for frequency and urgency.   Musculoskeletal:  Negative for arthralgias.   Skin:  Negative for rash.   Neurological:  Negative for numbness.   Psychiatric/Behavioral:  Negative for dysphoric mood. The patient is not nervous/anxious.         Anxiety           Objective   There were no vitals taken for this visit.    Physical Exam  Physical Exam  Constitutional:       Appearance: Normal appearance.   Neurological:      Mental Status: She is alert and oriented to person, place, and time.   Psychiatric:         Mood and Affect: Mood normal.         Behavior: Behavior normal.         Thought Content: Thought content normal.         Judgment: Judgment normal.         Lab Review  Lab Results   Component Value Date    HGBA1C 5.5 05/12/2025     Lab Results   Component Value Date    LDLCALC 99 05/12/2025       Weight Trends  Trends of weight reviewed in visit, see graph below:  Wt Readings from Last 3 Encounters:   05/29/25 79.4 kg (175 lb)   05/16/25 79.8 kg (176 lb)   05/12/25 79.4 kg (175 lb)   (  Assessment/Plan     Follow up and Goals:  Nutrition: focus on " high protein and high fiber, Healthy Plate, 25-30 grams protein per meal, have dietitian consult  Physical Exercise: Physical Exercise-YouTube or Apps: for free weight workouts- HasFit, Burn Boot Camps -do modifications. Building muscle mass is important with weight loss process and maintenance of weight. It also benefit bone health and strength and decreases falls risk and assists with balance.  It additionally increases our resting metabolic states and decreases risk of muscle wasting with weight loss and in use of the GLP1 medications, additionally taking in protein rich foods is important.  Medications: Contrave is an option to address emotional eating and cravings and the anxiety. Stop Prozac when you start the Contrave  Follow up: Dietitian consult. Weight management group in 1-2 months. I will send your contact information as requested to Dr. Martinez. Reach out on [employer] with any questions. Thank you      Visit length of 45 minutes      Problem List Items Addressed This Visit    None      Diet interventions: referral to dietitian for guidance in these changes  Discussed Mediterranean Diet, given pamphlet or it will be mailed, we looked at ADA plate, portion sizes, recipes. Advised to review in preparation for nutrition consult    Handouts given: yes    Exercise intervention:   We discussed the importance of incorporating resistance and free weight  lifting into physical activity routine to prevent muscle wasting with weight loss, enhance bone health, the positive role increased muscle has in burning fat at rest. We looked at examples of these types of exercise routines online. Advised to do modifications. Advised to check with PCP if there is a h/o musculoskeletal injury or hx. We discussed benefits of walking with weight loss and as a cardio form of exercise. Gradually increase exercise to a goal of 150 minutes at least per week.          Follow Up:  Referred to nutrition or continue to work with current  dietitian   Discussed obesity medications, side effects and mechanism of action reviewed with patient  Discussed physical activity: we reviewed Youtube videos for strength training, advised to use modifications for these videos, we discussed benefits of strength training and resistance bands  on bone and muscle health, we discussed walking and water aerobic options for cardio  Discussed Mediterranean Diet, given pamphlet or it will be mailed, we looked at ADA plate, portion sizes, recipes. Advised to review Mediterranean Meal Plan booklet  in preparation for nutrition consult  ....  1 month group visit and nutrition visit in person or  virtual  Please reach out if you need anything or have further questions    Options for paying out of pocket for GLP1 weight  medications (Zepbound, Wegovy) include the following:  [employer] sells the Semaglutide, the medication in Wegovy, for 200-275 for a one month supply. This medication formula does include vitamin B in it as well (versus the brand name that does not contain vitamin B), this is due to the compound brand being required  to be 10% different from the company's patented formula per FDA regulation. [employer] is the [employer] affordability program for  Wegovy medication. Wegovy  can be purchased through the program for 399 dollars per month for one month supply of the medication. [employer] is the [place] affordability program. The  Zepbound  can be purchased from 349 to 499 dollars per month for a one month  supply of the medication.         [1]   Past Medical History:  Diagnosis Date    Constipation 07/15/2022    Dizziness 11/07/2023    Happens when she changes direction in running      Dyspnea 11/07/2023    Finger injury 11/07/2023    Idiopathic scoliosis 11/06/2017    Nasal fracture 11/07/2023    Other conditions influencing health status     Denial Of Any Significant Medical History    Other conditions influencing health status 11/05/2017    No  significant past medical history    Pilonidal cyst with abscess 07/28/2020    Pilonidal abscess    Sprain of interphalangeal joint of finger 11/07/2023    Vitamin D deficiency 2020    Vocal cord dysfunction 03/23/2022    ENT--[facility]     [2]   Current Outpatient Medications   Medication Sig Dispense Refill    albuterol 90 mcg/actuation inhaler Inhale 2 puffs every 4 hours if needed for wheezing. 18 g 4    azelastine (Astelin) 137 mcg (0.1 %) nasal spray 2 sprays by Does not apply route twice a day.      desonide (DesOwen) 0.05 % ointment       FLUoxetine (PROzac) 10 mg tablet Take 1 tablet (10 mg) by mouth once daily for 7 days, THEN 2 tablets (20 mg) once daily for 28 days. 63 tablet 0    fluticasone (Flonase Sensimist) 27.5 mcg/actuation nasal spray 2 sprays by Does not apply route once daily.      fluticasone propion-salmeteroL (Advair Diskus) 250-50 mcg/dose diskus inhaler Inhale 1 puff 2 times a day. 60 each 2    montelukast (Singulair) 10 mg tablet Take 1 tablet (10 mg) by mouth once daily at bedtime. 90 tablet 3    Nortrel 0.5/35, 28, 0.5-35 mg-mcg tablet Take 1 tablet by mouth once daily. 84 tablet 4    sertraline (Zoloft) 25 mg tablet Take 1 tablet (25 mg) by mouth once daily for 14 days, THEN 2 tablets (50 mg) once daily for 14 days. 42 tablet 0     No current facility-administered medications for this visit.

## 2025-07-01 NOTE — PATIENT INSTRUCTIONS
Nutrition: focus on high protein and high fiber, Healthy Plate, 25-30 grams protein per meal, have dietitian consult  Physical Exercise: Physical Exercise-YouTube or Apps: for free weight workouts- HasFit, Burn Boot Camps -do modifications. Building muscle mass is important with weight loss process and maintenance of weight. It also benefit bone health and strength and decreases falls risk and assists with balance.  It additionally increases our resting metabolic states and decreases risk of muscle wasting with weight loss and in use of the GLP1 medications, additionally taking in protein rich foods is important.  Medications: Contrave is an option to address emotional eating and cravings and the anxiety. Stop Prozac when you start the Contrave  Follow up: Dietitian consult. Weight management group in 1-2 months. I will send your contact information as requested to Dr. Martinez. Reach out on [employer] with any questions. Thank you      [facility]: Ob[facility]  Today we discussed some of the following medications.  If not prescribed already, please look into these medications on your own, and please call your insurance for coverage questions.  If you would like to contact our office with additional questions or a request for a prescription, write us via a [employer] message, or call [phone].    The following summarizes the medications currently available for weight management:    q    Phentermine (aka Adipex):   This medication is a stimulant and can suppress appetite. Common side effects include an increase in blood pressure (BP) and heart rate (HR), and excessive thirst. You will need to be able to monitor both BP and HR while on this medication. If you have any cardiac issues you may need to contact your cardiologist/PCP to get authorization to take the medication. This medication is a controlled substance in the State of [place]. Per the law, you will need to sign a stimulant consent form when  taking Phentermine.  Additionally, you will need to be seen in the office (in person) every 3 months when on this medication.  Please schedule appointments in advance to ensure that we comply with the [place] Law. Phentermine is usually the most cost effective of the appetite suppressants (usually no more than $40/month and can sometimes use GoodRX).  q Qsymia:   This is a combination of two medications: Phentermine and Topamax (aka Topiramate). Topamax is often given for migraine headaches and additionally contributes to appetite suppression.  Since phentermine is part of Qsymia, this medication combination is also considered a controlled substance.  A stimulant consent will need to be signed upon initiation of this medication. Similar to phentermine alone, Qsymia also requires an in person office visit every 3 months.  Schedule visits in advance to comply with the [place] Law.  The cost of the medication depends on the patient's individual insurance but if too costly, the medication can be sent to an online mail order pharmacy (directions below) for approximately $100/month. [email or web address]  you will need to go to this website, register in the top right hand corner. The online pharmacy will contact you for billing info and our office can electronically send the script to them.   q Contrave:  This medication is a combination of Wellbutrin (aka Buproprion) and Naltrexone. The medication combination helps to decrease appetite and sometimes cravings as well.   Contrave can cause opioid withdrawal.  Patients using chronic opioid therapy should not sure this medication.  Those who use temporary opioid therapy should hold contrave until 14 days after their last opioid dose.  When taking Contrave, a titration process is needed over the first month.   Titration:  For the first week you will take one tablet in the AM,  For the 2nd week you will take one tablet in the AM and one tablet in the PM,   For the 3rd  week you will take 2 tablets in the AM and one tablet in the PM, and   For the fourth week (and onward) you will take 2 tablets in the AM and 2 tablets in the PM.     The cost of this medication depends on the individual's insurance. There are 2 options if the medication is not affordable at the retail pharmacy:  It can be sent to a mail order pharmacy that approximates to ~ $100/month.    Mail order pharmacy- [email or web address] You will need to go to this website to register and our office will send an electronic script.  Our office can send each individual medication (Wellbutrin, Naltrexone) to your local   pharmacy.  If sending the two individual medications to your local pharmacy, the prescriptions will appear as: Wellbutrin 150mg tablet taken daily and Naltrexone 50mg- start taking half tablet for one week and then increase to one tablet daily.   q Wegovy:   This medication is a weekly injection (and actually contains the same ingredient as in Ozempic). This medication is FDA approved for weight loss.  Common side effects include nausea, vomiting, diarrhea or constipation and abdominal pain.  Often these GI side effects resolve with time.  Some patients have shared that injecting the medicine to the thigh area instead of the stomach area helps with the GI side effects.  Do not take this medication if you have a history of pancreatitis.  Additionally, do not take this medication if you or any family members have been diagnosed with Medullary Thyroid Cancer or Multiple Endocrine Neoplasia.  If insurance coverage is poor, retail price for this medication is approximately ~$1200/month.  q Zepbound:   This medication is a weekly injection (and actually contains the same ingredient as in Mounjaro). This medication is FDA approved for weight loss.  Common side effects include nausea, vomiting, diarrhea or constipation and abdominal pain.  Often these GI side effects resolve with time.  Some patients  have shared that injecting the medicine to the thigh area instead of the stomach area helps with the GI side effects.  Do not take this medication if you have a history of pancreatitis.  Additionally do not take this medication if you or any family members have been diagnosed with Medullary Thyroid Cancer or Multiple Endocrine Neoplasia.  If insurance coverage is poor, retail price for this medication is approximately ~$1200/month.  q Saxenda:   This medication is a daily injection.  This medication is FDA approved for weight loss.  Common side effects include nausea, vomiting, diarrhea or constipation and abdominal pain.  Often these GI side effects resolve with time.  Some patients have shared that injecting the medicine to the thigh area instead of the stomach area helps with the GI side effects.  Do not take this medication if you have a history of pancreatitis.  Additionally, do not take this medication if you or any family members have been diagnosed with Medullary Thyroid Cancer or Multiple Endocrine Neoplasia.  If insurance coverage is poor, retail price for this medication is approximately ~$1200/month.        q Ozempic:  This is a diabetes medication and is administered as a weekly injection. It is often NOT covered by insurance unless you are officially diagnosed with type 2 diabetes.  Common side effects include nausea, vomiting, diarrhea or constipation and abdominal pain.  Often these GI side effects resolve with time.  Some patients have shared that injecting the medicine to the thigh area instead of the stomach area helps with the GI side effects.  Do not take this medication if you have a history of pancreatitis.  Additionally, do not take this medication if you or any family members have been diagnosed with Medullary Thyroid Cancer or Multiple Endocrine Neoplasia.  If insurance coverage is poor, retail price for this medication is approximately ~$1200/month.  q Mounjaro:   This is a diabetes  medication and is administered as a weekly injection. It is often NOT covered by insurance unless you are officially diagnosed with type 2 diabetes.  Common side effects include nausea, vomiting, diarrhea or constipation and abdominal pain.  Often these GI side effects resolve with time.  Some patients have shared that injecting the medicine to the thigh area instead of the stomach area helps with the GI side effects.  Do not take this medication if you have a history of pancreatitis.  Additionally, do not take this medication if you or any family members have been diagnosed with Medullary Thyroid Cancer or Multiple Endocrine Neoplasia.  If insurance coverage is poor, retail price for this medication is approximately ~$1200/month.      Please always review the official side effect profile for the above medications with the Pharmacist if further questions arise.

## 2025-07-02 ENCOUNTER — APPOINTMENT (OUTPATIENT)
Dept: PEDIATRICS | Facility: CLINIC | Age: 20
End: 2025-07-02
Payer: COMMERCIAL

## 2025-07-02 VITALS
BODY MASS INDEX: 30.69 KG/M2 | HEART RATE: 91 BPM | WEIGHT: 173.2 LBS | HEIGHT: 63 IN | DIASTOLIC BLOOD PRESSURE: 75 MMHG | SYSTOLIC BLOOD PRESSURE: 112 MMHG

## 2025-07-02 DIAGNOSIS — F41.9 ANXIETY: Primary | ICD-10-CM

## 2025-07-02 PROCEDURE — 99213 OFFICE O/P EST LOW 20 MIN: CPT | Performed by: PEDIATRICS

## 2025-07-02 PROCEDURE — 3008F BODY MASS INDEX DOCD: CPT | Performed by: PEDIATRICS

## 2025-07-02 NOTE — PROGRESS NOTES
"Mental Health Follow-up  Halina Jimenez is a 20 y.o. female  following up today for mental health status.  She was started on prozac due to anxiety.  She was seen by NP yesterday in regards to her weight.  She was started on Contrave (naltrexone bupropion) which helps with anxiety and weight.  She was also referred to counseling.  She was told that she should talk to me about going off of the prozac as she shouldn't be on both.   Counseling:  Michelle - will start appt - she specializes in eating issues  Current medication: prozac 20 mg  Patient reports no side effects.  Patient reports symptoms have remain unchanged since last visit.    Review of Systems: Negative except those noted in current and interim history    PHYSICAL EXAM  /75   Pulse 91   Ht 1.605 m (5' 3.19\")   Wt 78.6 kg (173 lb 3.2 oz)   LMP 06/22/2025 (Approximate)   BMI 30.50 kg/m²     General:  alert and oriented, in no acute distress    Breathing comfortably.                Skin:  warm and dry, no hyperpigmentation, vitiligo, or suspicious lesions      Extremities:  extremities normal, warm and well-perfused; no cyanosis, clubbing, or edema      Neurological: alert, oriented x 3, no deficits noted in general exam.     ASSESSMENT AND PLAN  Diagnoses and all orders for this visit:  Anxiety      Halina Jimenez does meet criteria for Anxiety Disorder, NOS and reactive depression.    The plan is to go to 10 mg of prozac then stop once her new med arrives and she starts that.  She will be following up with the NP.  She should follow up with me over winter break for wcc.     May return to clinic or call sooner if significant side effects or concerns.     "

## 2025-08-05 ENCOUNTER — APPOINTMENT (OUTPATIENT)
Dept: ENDOCRINOLOGY | Facility: CLINIC | Age: 20
End: 2025-08-05
Payer: COMMERCIAL

## 2025-08-05 VITALS — BODY MASS INDEX: 30.68 KG/M2 | HEIGHT: 63 IN

## 2025-08-05 DIAGNOSIS — Z71.3 DIETARY COUNSELING: ICD-10-CM

## 2025-08-05 PROCEDURE — 97802 MEDICAL NUTRITION INDIV IN: CPT | Performed by: DIETITIAN, REGISTERED

## 2025-08-05 NOTE — PROGRESS NOTES
"Initial Nutrition Assessment    Patient was referred to nutrition by KARON Herrera  for weight management/desire to lose weight. Other PMHX significant for Asthma.     Diet recall reveals a consistent meal pattern with rare skipped meals, as well as recent implementation of well balanced meals with attention to consistent lean protein, fruits/vegetables/complex CHO, and healthy fat food sources in appropriate portions to assist in achieving goals, and if behaviors maintained, weight loss likely to be achieved. Fluids meeting recommendations in type and amount with water as primary beverage. Pt is incorporating consistent physical activity, meeting weekly recommendations. See all interventions/recommendations below as discussed during visit this day.     Patient reported symptoms: Difficulty losing weight    Anthropometrics:  Height:   Ht Readings from Last 1 Encounters:   07/02/25 1.605 m (5' 3.19\")      Weight:   Wt Readings from Last 10 Encounters:   07/02/25 78.6 kg (173 lb 3.2 oz)   07/01/25 79.4 kg (175 lb)   05/29/25 79.4 kg (175 lb)   05/16/25 79.8 kg (176 lb)   05/12/25 79.4 kg (175 lb)   12/20/24 76.2 kg (168 lb)   12/18/23 76.3 kg (168 lb 2 oz) (91%, Z= 1.34)*   11/21/23 77.3 kg (170 lb 6.4 oz) (92%, Z= 1.40)*   01/09/23 68.5 kg (151 lb) (83%, Z= 0.96)*   12/21/22 75.8 kg (167 lb) (92%, Z= 1.38)*     * Growth percentiles are based on CDC (Girls, 2-20 Years) data.      Current BMI:   BMI Readings from Last 1 Encounters:   07/02/25 30.50 kg/m²        Labs:  Lab Results   Component Value Date    HGBA1C 5.5 05/12/2025      Lab Results   Component Value Date    CHOL 187 05/12/2025    CHOL 169 01/27/2021     Lab Results   Component Value Date    HDL 65 05/12/2025    HDL 54.3 01/27/2021     Lab Results   Component Value Date    LDLCALC 99 05/12/2025     Lab Results   Component Value Date    TRIG 134 05/12/2025    TRIG 108 01/27/2021       Malnutrition Screening:  Significant Unintentional weight loss: " No  Eating less than 75% of usual intake for more than 2 weeks: No  Potential Signs of Inflammation: No    Recommended Malnutrition Diagnosis: No malnutrition identified    Diet Recall-  Breakfast- Protein shake OR protein balls OR eggs and fruit OR eggs with turkey sausage and fruit   Lunch- leftovers from dinner  Dinner- chicken orzo OR various proteins with vegetables and occasional starches   Daily Snacks- protein balls, popcorn, fruit, protein bar, pretzels  Beverages- water throughout the day (64+ ounces daily) and one poppi daily   Alcohol- 4 drinks on weekend nights socially   Physical Activity- walking daily and works out 4-5 times weekly (cardio and strength training) and soccer once weekly    Patient reported Information:  - Pt just started on medication Contrave two weeks ago.  - Tracking food intake again most recently on [employer]itness Pal and aiming for 1200 calories daily.   - When consistently tracking in the past was successful with weight loss.    Nutrition Diagnosis: Food and nutrition-related knowledge deficit related to lack of recent exposure to information as evidenced by BMI above normative standard for age and gender.    Readiness to Learn:  Cognitive ability: Alert and oriented  Motivation to learn: Interested  Family Support: Unable to assess- family not present  Instruction provided to: Patient  Patient learns best by: Multiple methods  Factors affecting learning: None   Physical limitations affecting learning: None    Education Materials Provided:   Your Mediterranean Meal Plan Booklet    Nutrition Interventions/Recommendations for 8/5/2025:  - Please refer to your book entitled: Your Mediterranean Meal Plan, and follow Mediterranean Diet eating guidelines as reviewed.  - Please aim for a source of healthy protein and fiber rich foods at meals as discussed for nutrition needs as well as to help provide better satiety at meals.   - Consider pre-planning healthy meals for the week. Refer to  your book for both menu and recipe ideas to get you started.  - Consider tracking daily food intake for accountability with food choices consistently and aim for 6663-0897 calories daily.  - Keep up the great work with your exercise and water intakes.  - Follow-up as scheduled for the group classes as scheduled.      Nutrition Monitoring & Evaluation: adherence to recommendations and patient stated goals    Need for follow-up: As scheduled for [facility] ([facility])    Referred by: KARON Herrera     MNT Billing Type: Medical Nutrition Assessment, each 15 min increment, for 3 increments.    SIGNATURE:   Ariana Guerra RD, KEMAR, LD, CDCES                                                        DATE:   8/5/2025

## 2025-08-06 NOTE — PATIENT INSTRUCTIONS
- Please refer to your book entitled: Your Mediterranean Meal Plan, and follow Mediterranean Diet eating guidelines as reviewed.  - Please aim for a source of healthy protein and fiber rich foods at meals as discussed for nutrition needs as well as to help provide better satiety at meals.   - Consider pre-planning healthy meals for the week. Refer to your book for both menu and recipe ideas to get you started.  - Consider tracking daily food intake for accountability with food choices consistently and aim for 8066-1490 calories daily.  - Keep up the great work with your exercise and water intakes.  - Follow-up as scheduled for the group classes as scheduled.

## 2025-08-08 ENCOUNTER — APPOINTMENT (OUTPATIENT)
Dept: NUTRITION | Facility: HOSPITAL | Age: 20
End: 2025-08-08
Payer: COMMERCIAL

## 2025-10-02 ENCOUNTER — APPOINTMENT (OUTPATIENT)
Dept: ENDOCRINOLOGY | Facility: CLINIC | Age: 20
End: 2025-10-02
Payer: COMMERCIAL

## 2025-11-03 ENCOUNTER — APPOINTMENT (OUTPATIENT)
Dept: ENDOCRINOLOGY | Facility: CLINIC | Age: 20
End: 2025-11-03
Payer: COMMERCIAL

## 2025-12-04 ENCOUNTER — APPOINTMENT (OUTPATIENT)
Dept: ENDOCRINOLOGY | Facility: CLINIC | Age: 20
End: 2025-12-04
Payer: COMMERCIAL